# Patient Record
Sex: MALE | URBAN - METROPOLITAN AREA
[De-identification: names, ages, dates, MRNs, and addresses within clinical notes are randomized per-mention and may not be internally consistent; named-entity substitution may affect disease eponyms.]

---

## 2018-10-30 ENCOUNTER — IMPORTED ENCOUNTER (OUTPATIENT)
Dept: URBAN - METROPOLITAN AREA CLINIC 9 | Facility: CLINIC | Age: 68
End: 2018-10-30

## 2018-11-27 ENCOUNTER — IMPORTED ENCOUNTER (OUTPATIENT)
Dept: URBAN - METROPOLITAN AREA CLINIC 9 | Facility: CLINIC | Age: 68
End: 2018-11-27

## 2018-12-05 ENCOUNTER — IMPORTED ENCOUNTER (OUTPATIENT)
Dept: URBAN - METROPOLITAN AREA CLINIC 9 | Facility: CLINIC | Age: 68
End: 2018-12-05

## 2018-12-13 ENCOUNTER — IMPORTED ENCOUNTER (OUTPATIENT)
Dept: URBAN - METROPOLITAN AREA CLINIC 9 | Facility: CLINIC | Age: 68
End: 2018-12-13

## 2018-12-19 ENCOUNTER — IMPORTED ENCOUNTER (OUTPATIENT)
Dept: URBAN - METROPOLITAN AREA CLINIC 9 | Facility: CLINIC | Age: 68
End: 2018-12-19

## 2018-12-27 ENCOUNTER — IMPORTED ENCOUNTER (OUTPATIENT)
Dept: URBAN - METROPOLITAN AREA CLINIC 9 | Facility: CLINIC | Age: 68
End: 2018-12-27

## 2019-10-24 ENCOUNTER — IMPORTED ENCOUNTER (OUTPATIENT)
Dept: URBAN - METROPOLITAN AREA CLINIC 9 | Facility: CLINIC | Age: 69
End: 2019-10-24

## 2020-10-07 ENCOUNTER — IMPORTED ENCOUNTER (OUTPATIENT)
Dept: URBAN - METROPOLITAN AREA CLINIC 9 | Facility: CLINIC | Age: 70
End: 2020-10-07

## 2021-09-03 NOTE — PATIENT DISCUSSION
- Follow up in 1 month for repeat Bassett pasha testing.  Consider MRI brain if no improvement within 3 months

## 2021-09-03 NOTE — PATIENT DISCUSSION
Stopped Today: prednisolone acetate (prednisolone acetate): drops,suspension: 1% 1 drop four times a day 12-

## 2021-10-18 ASSESSMENT — VISUAL ACUITY
OS_SC: 20/200 SN
OS_SC: 20/100 SN
OD_CC: 20/40 - SN
OS_CC: 20/25 SN
OD_CC: 20/20 SN
OS_CC: 20/20 - SN
OS_CC: 20/20 -2 SN
OD_CC: 20/25 + SN
OS_CC: 20/25 - SN
OD_CC: 20/20 -2 SN
OD_CC: 20/25 -2 SN
OD_SC: 20/70 - SN
OS_CC: 20/20 SN
OD_CC: 20/20 - SN
OD_SC: 20/200 SN
OS_CC: 20/20 SN
OD_CC: 20/20 SN
OD_CC: 20/25 SN
OD_CC: 20/30 -2 SN

## 2021-10-18 ASSESSMENT — KERATOMETRY
OD_AXISANGLE_DEGREES: 14
OS_AXISANGLE2_DEGREES: 79
OS_AXISANGLE2_DEGREES: 28
OD_K1POWER_DIOPTERS: 41.5
OD_AXISANGLE2_DEGREES: 104
OD_K1POWER_DIOPTERS: 41.25
OD_K1POWER_DIOPTERS: 41.25
OS_K1POWER_DIOPTERS: 40.75
OS_K2POWER_DIOPTERS: 41
OD_K2POWER_DIOPTERS: 42.5
OD_K2POWER_DIOPTERS: 43
OD_K2POWER_DIOPTERS: 42.5
OD_AXISANGLE2_DEGREES: 112
OD_AXISANGLE2_DEGREES: 111
OS_K1POWER_DIOPTERS: 41.25
OS_AXISANGLE2_DEGREES: 69
OS_AXISANGLE_DEGREES: 159
OS_K2POWER_DIOPTERS: 41.5
OD_AXISANGLE_DEGREES: 21
OS_AXISANGLE_DEGREES: 169
OS_K1POWER_DIOPTERS: 41
OD_AXISANGLE_DEGREES: 22
OS_K2POWER_DIOPTERS: 41.25
OS_AXISANGLE_DEGREES: 118

## 2021-10-18 ASSESSMENT — TONOMETRY
OS_IOP_MMHG: 14
OS_IOP_MMHG: 14
OS_IOP_MMHG: 16
OS_IOP_MMHG: 19
OS_IOP_MMHG: 15
OD_IOP_MMHG: 13
OD_IOP_MMHG: 21
OD_IOP_MMHG: 16
OD_IOP_MMHG: 15
OD_IOP_MMHG: 14

## 2021-11-08 NOTE — PATIENT DISCUSSION
Improved Bassett pasha measurements today (16 PD ET R gaze, 12 PD ET Prim gaze, 10 PD ET L gaze). IMPROVING.

## 2022-06-29 RX ORDER — LOSARTAN POTASSIUM AND HYDROCHLOROTHIAZIDE 25; 100 MG/1; MG/1
TABLET ORAL
COMMUNITY

## 2022-06-29 RX ORDER — CLOBETASOL PROPIONATE 0.5 MG/G
CREAM TOPICAL
COMMUNITY

## 2022-06-29 RX ORDER — MONTELUKAST SODIUM 10 MG/1
TABLET ORAL
COMMUNITY

## 2022-06-29 RX ORDER — SILDENAFIL 50 MG/1
TABLET, FILM COATED ORAL
COMMUNITY